# Patient Record
Sex: FEMALE | Race: AMERICAN INDIAN OR ALASKA NATIVE
[De-identification: names, ages, dates, MRNs, and addresses within clinical notes are randomized per-mention and may not be internally consistent; named-entity substitution may affect disease eponyms.]

---

## 2018-03-15 ENCOUNTER — HOSPITAL ENCOUNTER (EMERGENCY)
Dept: HOSPITAL 5 - ED | Age: 23
LOS: 1 days | Discharge: HOME | End: 2018-03-16
Payer: SELF-PAY

## 2018-03-15 VITALS — SYSTOLIC BLOOD PRESSURE: 108 MMHG | DIASTOLIC BLOOD PRESSURE: 71 MMHG
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
## 2018-03-15 DIAGNOSIS — S41.112A: Primary | ICD-10-CM
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
## 2018-03-15 DIAGNOSIS — E11.9: ICD-10-CM
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
## 2018-03-15 PROCEDURE — 90471 IMMUNIZATION ADMIN: CPT

## 2018-03-15 PROCEDURE — 90715 TDAP VACCINE 7 YRS/> IM: CPT
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
## 2018-03-16 NOTE — XRAY REPORT
FINAL REPORT



PROCEDURE:  XR FOREARM LT



TECHNIQUE:  LEFT forearm radiographs, AP and lateral views. CPT

48601







HISTORY:  laceration to forearm 



COMPARISON:  No prior studies are available for comparison.



FINDINGS:  

Fracture (s) and/or Dislocation(s): None .



Joint space(s): Normal .



Soft tissues: Soft tissue swelling along the lateral proximal

forearm..



Bone mineralization: Normal .



Foreign bodies: None .







IMPRESSION:  

No evidence of acute fracture. Soft tissue injury lateral

proximal forearm

## 2018-04-26 ENCOUNTER — HOSPITAL ENCOUNTER (EMERGENCY)
Dept: HOSPITAL 5 - ED | Age: 23
Discharge: HOME | End: 2018-04-26
Payer: SELF-PAY

## 2018-04-26 VITALS — SYSTOLIC BLOOD PRESSURE: 114 MMHG | DIASTOLIC BLOOD PRESSURE: 70 MMHG

## 2018-04-26 DIAGNOSIS — F12.10: ICD-10-CM

## 2018-04-26 DIAGNOSIS — N76.0: ICD-10-CM

## 2018-04-26 DIAGNOSIS — N39.0: Primary | ICD-10-CM

## 2018-04-26 DIAGNOSIS — E11.9: ICD-10-CM

## 2018-04-26 DIAGNOSIS — E78.00: ICD-10-CM

## 2018-04-26 LAB
BACTERIA #/AREA URNS HPF: (no result) /HPF
BILIRUB UR QL STRIP: (no result)
BLOOD UR QL VISUAL: (no result)
MUCOUS THREADS #/AREA URNS HPF: (no result) /HPF
PH UR STRIP: 6 [PH] (ref 5–7)
PROT UR STRIP-MCNC: (no result) MG/DL
RBC #/AREA URNS HPF: 1 /HPF (ref 0–6)
UROBILINOGEN UR-MCNC: < 2 MG/DL (ref ?–2)
WBC #/AREA URNS HPF: 13 /HPF (ref 0–6)

## 2018-04-26 PROCEDURE — 87591 N.GONORRHOEAE DNA AMP PROB: CPT

## 2018-04-26 PROCEDURE — 96372 THER/PROPH/DIAG INJ SC/IM: CPT

## 2018-04-26 PROCEDURE — 81025 URINE PREGNANCY TEST: CPT

## 2018-04-26 PROCEDURE — 87210 SMEAR WET MOUNT SALINE/INK: CPT

## 2018-04-26 PROCEDURE — 81001 URINALYSIS AUTO W/SCOPE: CPT

## 2018-04-26 PROCEDURE — 99283 EMERGENCY DEPT VISIT LOW MDM: CPT

## 2018-04-26 NOTE — EMERGENCY DEPARTMENT REPORT
ED Female  HPI





- General


Chief complaint: Urogenital-Female


Stated complaint: HIV/HERPES TEST


Time Seen by Provider: 04/26/18 14:13


Source: patient


Mode of arrival: Ambulatory


Limitations: No Limitations





- History of Present Illness


Initial comments: 


This is a 22-year-old female who presents to the ED complaining of malodorous 

vaginal discharge and some mild low pelvic pain for the past 34 days.  Patient 

states she's been sexually active with her partner since January of this year.  

Patient states that she got no text from her previous partners partner of a 

positive herpes test so she is worried she might have an STD.  Palpation admits 

mild dysuria and cloudy urine.  She states last menstrual period was 3 [phone].  

She denies vaginal bleed, fever, nausea vomiting, abdominal pain or any other 

problems





- Related Data


 Previous Rx's











 Medication  Instructions  Recorded  Last Taken  Type


 


Gentamicin 0.3% Ophth Soln 2 drops OP Q4H #1 bottle 09/19/15 Unknown Rx


 


Cephalexin [Keflex] 500 mg PO BID #20 capsule 03/16/18 Unknown Rx


 


Ciprofloxacin HCl [Ciprofloxacin 500 mg PO Q12HR #10 tab 04/26/18 Unknown Rx





TAB]    


 


Ibuprofen 600 mg PO Q8H PRN #20 tablet 04/26/18 Unknown Rx











 Allergies











Allergy/AdvReac Type Severity Reaction Status Date / Time


 


No Known Allergies Allergy   Verified 09/19/15 06:11














ED Review of Systems


ROS: 


Stated complaint: HIV/HERPES TEST


Other details as noted in HPI





Constitutional: denies: chills, fever


Eyes: denies: eye pain, eye discharge, vision change


ENT: denies: ear pain, throat pain


Respiratory: denies: cough, shortness of breath, wheezing


Cardiovascular: denies: chest pain, palpitations


Endocrine: no symptoms reported


Gastrointestinal: denies: abdominal pain, nausea, diarrhea


Genitourinary: dysuria, discharge.  denies: urgency


Musculoskeletal: denies: back pain, joint swelling, arthralgia


Skin: denies: rash, lesions, pruritus


Neurological: denies: headache, weakness, paresthesias


Psychiatric: denies: anxiety, depression


Hematological/Lymphatic: denies: easy bleeding, easy bruising





ED Past Medical Hx





- Past Medical History


Previous Medical History?: No


Hx Diabetes: Yes


Additional medical history: High cholesterol,





- Surgical History


Past Surgical History?: No





- Social History


Smoking Status: Never Smoker


Substance Use Type: Alcohol, Marijuana





- Medications


Home Medications: 


 Home Medications











 Medication  Instructions  Recorded  Confirmed  Last Taken  Type


 


Gentamicin 0.3% Ophth Soln 2 drops OP Q4H #1 bottle 09/19/15  Unknown Rx


 


Cephalexin [Keflex] 500 mg PO BID #20 capsule 03/16/18  Unknown Rx


 


Ciprofloxacin HCl [Ciprofloxacin 500 mg PO Q12HR #10 tab 04/26/18  Unknown Rx





TAB]     


 


Ibuprofen 600 mg PO Q8H PRN #20 tablet 04/26/18  Unknown Rx














ED Physical Exam





- General


Limitations: No Limitations


General appearance: alert, in no apparent distress





- Head


Head exam: Present: atraumatic, normocephalic





- Eye


Eye exam: Present: normal appearance





- ENT


ENT exam: Present: mucous membranes moist





- Neck


Neck exam: Present: normal inspection





- Respiratory


Respiratory exam: Present: normal lung sounds bilaterally.  Absent: respiratory 

distress





- Cardiovascular


Cardiovascular Exam: Present: regular rate, normal rhythm.  Absent: systolic 

murmur, diastolic murmur, rubs, gallop





- GI/Abdominal


GI/Abdominal exam: Present: soft, normal bowel sounds





- 


External exam: Present: normal external exam.  Absent: erythema, swelling, 

lesions


Speculum exam: Present: vaginal discharge, vaginal bleeding (from cycle just 

starting ).  Absent: erythema, cervical discharge, laceration


Bi-manual exam: Present: normal bi-manual exam.  Absent: cervical motion 

tendernes, adnexal tenderness, uterine enlargement, uterine tenderness





- Extremities Exam


Extremities exam: Present: normal inspection, full ROM





- Back Exam


Back exam: Present: normal inspection, full ROM.  Absent: CVA tenderness (R), 

CVA tenderness (L)





- Neurological Exam


Neurological exam: Present: alert, oriented X3





- Psychiatric


Psychiatric exam: Present: normal affect, normal mood





- Skin


Skin exam: Present: warm, dry, intact, normal color.  Absent: rash





ED Course


 Vital Signs











  04/26/18





  13:23


 


Temperature 98.8 F


 


Pulse Rate 63


 


Respiratory 17





Rate 


 


Blood Pressure 114/70


 


O2 Sat by Pulse 100





Oximetry 














ED Medical Decision Making





- Medical Decision Making


22-year-old female presents with STD exposure.


ED course: urinalysis and gonorrhea and Chlamydia cultures obtained.  

Urinalysis positive for leukorrhea, bacteria and white blood cell


Patient received 250 mg of Rocephin, azithromycin 1 g, Flagyl 2 g.


Discussed with patient possible STD due to exposure.


Discussed with patient findings and treatment.  Discussed with patient 

gonorrhea and Chlamydia cultures of the back in 3-4 days and she can call for 

results.


Discussed prophylaxis treatment patient is to abstain from sex 7-10 days as 

treatment.


Discussed patient partner knowledge and treatment.


Discussed the follow-up with the health department/[facility] for 

further STD testing.


Patient's alert and oriented times 3. Vital signs are normal patient is in no 

acute  discharge.


Discussed patient will need to be tested for herpes and other STDs elsewhere 

because herpes and other std will not be done in the ED today


Patient will be discharged home with instructions.





Critical care attestation.: 


If time is entered above; I have spent that time in minutes in the direct care 

of this critically ill patient, excluding procedure time.








ED Disposition


Clinical Impression: 


 Exposure to STD, Bacterial vaginosis





UTI (urinary tract infection)


Qualifiers:


 Urinary tract infection type: acute cystitis 





Disposition: DC-01 TO HOME OR SELFCARE


Is pt being admited?: No


Does the pt Need Aspirin: No


Condition: Stable


Instructions:  Cervicitis (ED), Sexually Transmitted Diseases (ED), Safe Sex (ED

), Urinary Tract Infection in Women (ED), Bacterial Vaginosis (ED)


Additional Instructions: 


Make sure to follow up with the primary care physician as discussed.


Take all your medications as you've been prescribed.


If you have any worsening symptoms or develop new symptoms please return to ED 

immediately.


Prescriptions: 


Ciprofloxacin HCl [Ciprofloxacin TAB] 500 mg PO Q12HR #10 tab


Ibuprofen 600 mg PO Q8H PRN #20 tablet


 PRN Reason: Pain


Referrals: 


PRIMARY CARE,MD [Primary Care Provider] - 3-5 Days


[facility] [place] [Outside] - 3-5 Days


[facility] [Outside] - 3-5 Days


[facility] [Outside] - 3-5 Days


Forms:  Accompanied Note, Work/School Release Form(ED), STI Treatment and 

Prevention


Time of Disposition: 16:32